# Patient Record
Sex: FEMALE | Race: BLACK OR AFRICAN AMERICAN | NOT HISPANIC OR LATINO
[De-identification: names, ages, dates, MRNs, and addresses within clinical notes are randomized per-mention and may not be internally consistent; named-entity substitution may affect disease eponyms.]

---

## 2018-06-11 ENCOUNTER — APPOINTMENT (OUTPATIENT)
Dept: ANTEPARTUM | Facility: CLINIC | Age: 38
End: 2018-06-11
Payer: COMMERCIAL

## 2018-06-11 ENCOUNTER — APPOINTMENT (OUTPATIENT)
Dept: MATERNAL FETAL MEDICINE | Facility: CLINIC | Age: 38
End: 2018-06-11
Payer: COMMERCIAL

## 2018-06-11 ENCOUNTER — ASOB RESULT (OUTPATIENT)
Age: 38
End: 2018-06-11

## 2018-06-11 VITALS
SYSTOLIC BLOOD PRESSURE: 92 MMHG | HEIGHT: 60 IN | HEART RATE: 88 BPM | WEIGHT: 184 LBS | DIASTOLIC BLOOD PRESSURE: 64 MMHG | BODY MASS INDEX: 36.12 KG/M2

## 2018-06-11 DIAGNOSIS — O45.93 PREMATURE SEPARATION OF PLACENTA, UNSPECIFIED, THIRD TRIMESTER: ICD-10-CM

## 2018-06-11 DIAGNOSIS — Z78.9 OTHER SPECIFIED HEALTH STATUS: ICD-10-CM

## 2018-06-11 DIAGNOSIS — Z98.891 HISTORY OF UTERINE SCAR FROM PREVIOUS SURGERY: ICD-10-CM

## 2018-06-11 DIAGNOSIS — Z92.89 PERSONAL HISTORY OF OTHER MEDICAL TREATMENT: ICD-10-CM

## 2018-06-11 DIAGNOSIS — Z87.891 PERSONAL HISTORY OF NICOTINE DEPENDENCE: ICD-10-CM

## 2018-06-11 DIAGNOSIS — O09.529 SUPERVISION OF ELDERLY MULTIGRAVIDA, UNSPECIFIED TRIMESTER: ICD-10-CM

## 2018-06-11 PROCEDURE — 76816 OB US FOLLOW-UP PER FETUS: CPT

## 2018-06-11 PROCEDURE — 99244 OFF/OP CNSLTJ NEW/EST MOD 40: CPT

## 2018-06-11 RX ORDER — PRENATAL VIT NO.126/IRON/FOLIC 28MG-0.8MG
28-0.8 TABLET ORAL
Refills: 0 | Status: ACTIVE | COMMUNITY

## 2018-07-02 ENCOUNTER — OUTPATIENT (OUTPATIENT)
Dept: OUTPATIENT SERVICES | Facility: HOSPITAL | Age: 38
LOS: 1 days | End: 2018-07-02
Payer: COMMERCIAL

## 2018-07-02 DIAGNOSIS — Z01.818 ENCOUNTER FOR OTHER PREPROCEDURAL EXAMINATION: ICD-10-CM

## 2018-07-02 LAB
ANION GAP SERPL CALC-SCNC: 15 MMOL/L — SIGNIFICANT CHANGE UP (ref 5–17)
APPEARANCE UR: CLEAR — SIGNIFICANT CHANGE UP
APTT BLD: 30.7 SEC — SIGNIFICANT CHANGE UP (ref 27.5–37.4)
BASOPHILS # BLD AUTO: 0 K/UL — SIGNIFICANT CHANGE UP (ref 0–0.2)
BASOPHILS NFR BLD AUTO: 0.2 % — SIGNIFICANT CHANGE UP (ref 0–2)
BILIRUB UR-MCNC: NEGATIVE — SIGNIFICANT CHANGE UP
BUN SERPL-MCNC: 9 MG/DL — SIGNIFICANT CHANGE UP (ref 8–20)
CALCIUM SERPL-MCNC: 9 MG/DL — SIGNIFICANT CHANGE UP (ref 8.6–10.2)
CHLORIDE SERPL-SCNC: 100 MMOL/L — SIGNIFICANT CHANGE UP (ref 98–107)
CO2 SERPL-SCNC: 21 MMOL/L — LOW (ref 22–29)
COLOR SPEC: YELLOW — SIGNIFICANT CHANGE UP
CREAT SERPL-MCNC: 0.51 MG/DL — SIGNIFICANT CHANGE UP (ref 0.5–1.3)
DIFF PNL FLD: NEGATIVE — SIGNIFICANT CHANGE UP
EOSINOPHIL # BLD AUTO: 0.1 K/UL — SIGNIFICANT CHANGE UP (ref 0–0.5)
EOSINOPHIL NFR BLD AUTO: 1 % — SIGNIFICANT CHANGE UP (ref 0–6)
GLUCOSE SERPL-MCNC: 66 MG/DL — LOW (ref 70–115)
GLUCOSE UR QL: NEGATIVE MG/DL — SIGNIFICANT CHANGE UP
HCT VFR BLD CALC: 33.4 % — LOW (ref 37–47)
HGB BLD-MCNC: 11 G/DL — LOW (ref 12–16)
INR BLD: 1.05 RATIO — SIGNIFICANT CHANGE UP (ref 0.88–1.16)
KETONES UR-MCNC: NEGATIVE — SIGNIFICANT CHANGE UP
LEUKOCYTE ESTERASE UR-ACNC: NEGATIVE — SIGNIFICANT CHANGE UP
LYMPHOCYTES # BLD AUTO: 1.5 K/UL — SIGNIFICANT CHANGE UP (ref 1–4.8)
LYMPHOCYTES # BLD AUTO: 24.3 % — SIGNIFICANT CHANGE UP (ref 20–55)
MCHC RBC-ENTMCNC: 30.3 PG — SIGNIFICANT CHANGE UP (ref 27–31)
MCHC RBC-ENTMCNC: 32.9 G/DL — SIGNIFICANT CHANGE UP (ref 32–36)
MCV RBC AUTO: 92 FL — SIGNIFICANT CHANGE UP (ref 81–99)
MONOCYTES # BLD AUTO: 0.7 K/UL — SIGNIFICANT CHANGE UP (ref 0–0.8)
MONOCYTES NFR BLD AUTO: 10.9 % — HIGH (ref 3–10)
NEUTROPHILS # BLD AUTO: 3.9 K/UL — SIGNIFICANT CHANGE UP (ref 1.8–8)
NEUTROPHILS NFR BLD AUTO: 63 % — SIGNIFICANT CHANGE UP (ref 37–73)
NITRITE UR-MCNC: NEGATIVE — SIGNIFICANT CHANGE UP
PH UR: 6 — SIGNIFICANT CHANGE UP (ref 5–8)
PLATELET # BLD AUTO: 189 K/UL — SIGNIFICANT CHANGE UP (ref 150–400)
POTASSIUM SERPL-MCNC: 4 MMOL/L — SIGNIFICANT CHANGE UP (ref 3.5–5.3)
POTASSIUM SERPL-SCNC: 4 MMOL/L — SIGNIFICANT CHANGE UP (ref 3.5–5.3)
PROT UR-MCNC: 15 MG/DL
PROTHROM AB SERPL-ACNC: 11.6 SEC — SIGNIFICANT CHANGE UP (ref 9.8–12.7)
RBC # BLD: 3.63 M/UL — LOW (ref 4.4–5.2)
RBC # FLD: 14.7 % — SIGNIFICANT CHANGE UP (ref 11–15.6)
SODIUM SERPL-SCNC: 136 MMOL/L — SIGNIFICANT CHANGE UP (ref 135–145)
SP GR SPEC: 1.02 — SIGNIFICANT CHANGE UP (ref 1.01–1.02)
TYPE + AB SCN PNL BLD: SIGNIFICANT CHANGE UP
UROBILINOGEN FLD QL: NEGATIVE MG/DL — SIGNIFICANT CHANGE UP
WBC # BLD: 6.2 K/UL — SIGNIFICANT CHANGE UP (ref 4.8–10.8)
WBC # FLD AUTO: 6.2 K/UL — SIGNIFICANT CHANGE UP (ref 4.8–10.8)

## 2018-07-02 PROCEDURE — 85610 PROTHROMBIN TIME: CPT

## 2018-07-02 PROCEDURE — 86901 BLOOD TYPING SEROLOGIC RH(D): CPT

## 2018-07-02 PROCEDURE — 85730 THROMBOPLASTIN TIME PARTIAL: CPT

## 2018-07-02 PROCEDURE — 86900 BLOOD TYPING SEROLOGIC ABO: CPT

## 2018-07-02 PROCEDURE — 36415 COLL VENOUS BLD VENIPUNCTURE: CPT

## 2018-07-02 PROCEDURE — 81001 URINALYSIS AUTO W/SCOPE: CPT

## 2018-07-02 PROCEDURE — 86850 RBC ANTIBODY SCREEN: CPT

## 2018-07-02 PROCEDURE — 85027 COMPLETE CBC AUTOMATED: CPT

## 2018-07-02 PROCEDURE — 80048 BASIC METABOLIC PNL TOTAL CA: CPT

## 2018-07-15 ENCOUNTER — TRANSCRIPTION ENCOUNTER (OUTPATIENT)
Age: 38
End: 2018-07-15

## 2018-07-16 ENCOUNTER — INPATIENT (INPATIENT)
Facility: HOSPITAL | Age: 38
LOS: 3 days | Discharge: HOME | End: 2018-07-20
Attending: OBSTETRICS & GYNECOLOGY | Admitting: OBSTETRICS & GYNECOLOGY
Payer: COMMERCIAL

## 2018-07-16 ENCOUNTER — TRANSCRIPTION ENCOUNTER (OUTPATIENT)
Age: 38
End: 2018-07-16

## 2018-07-16 VITALS — HEIGHT: 60 IN | WEIGHT: 187.39 LBS

## 2018-07-16 DIAGNOSIS — O34.219 MATERNAL CARE FOR UNSPECIFIED TYPE SCAR FROM PREVIOUS CESAREAN DELIVERY: ICD-10-CM

## 2018-07-16 LAB
BASE EXCESS BLDCOA CALC-SCNC: -3.5 MMOL/L — LOW (ref -2–2)
BASE EXCESS BLDCOV CALC-SCNC: -2.8 MMOL/L — LOW (ref -2–2)
BLD GP AB SCN SERPL QL: SIGNIFICANT CHANGE UP
GAS PNL BLDCOV: 7.33 — SIGNIFICANT CHANGE UP (ref 7.25–7.45)
HCO3 BLDCOA-SCNC: 20 MMOL/L — LOW (ref 21–29)
HCO3 BLDCOV-SCNC: 22 MMOL/L — SIGNIFICANT CHANGE UP (ref 21–29)
PCO2 BLDCOA: 52.6 MMHG — SIGNIFICANT CHANGE UP (ref 32–68)
PCO2 BLDCOV: 44 MMHG — SIGNIFICANT CHANGE UP (ref 29–53)
PH BLDCOA: 7.26 — SIGNIFICANT CHANGE UP (ref 7.18–7.38)
PO2 BLDCOA: 12.6 MMHG — SIGNIFICANT CHANGE UP (ref 5.7–30.5)
PO2 BLDCOA: 29.2 MMHG — SIGNIFICANT CHANGE UP (ref 17–41)
SAO2 % BLDCOA: SIGNIFICANT CHANGE UP
SAO2 % BLDCOV: SIGNIFICANT CHANGE UP
TYPE + AB SCN PNL BLD: SIGNIFICANT CHANGE UP

## 2018-07-16 RX ORDER — ACETAMINOPHEN 500 MG
650 TABLET ORAL EVERY 6 HOURS
Qty: 0 | Refills: 0 | Status: DISCONTINUED | OUTPATIENT
Start: 2018-07-16 | End: 2018-07-20

## 2018-07-16 RX ORDER — OXYTOCIN 10 UNIT/ML
333.33 VIAL (ML) INJECTION
Qty: 20 | Refills: 0 | Status: DISCONTINUED | OUTPATIENT
Start: 2018-07-16 | End: 2018-07-20

## 2018-07-16 RX ORDER — DOCUSATE SODIUM 100 MG
100 CAPSULE ORAL
Qty: 0 | Refills: 0 | Status: DISCONTINUED | OUTPATIENT
Start: 2018-07-16 | End: 2018-07-20

## 2018-07-16 RX ORDER — ONDANSETRON 8 MG/1
4 TABLET, FILM COATED ORAL EVERY 6 HOURS
Qty: 0 | Refills: 0 | Status: DISCONTINUED | OUTPATIENT
Start: 2018-07-16 | End: 2018-07-16

## 2018-07-16 RX ORDER — HYDROMORPHONE HYDROCHLORIDE 2 MG/ML
0.5 INJECTION INTRAMUSCULAR; INTRAVENOUS; SUBCUTANEOUS
Qty: 0 | Refills: 0 | Status: DISCONTINUED | OUTPATIENT
Start: 2018-07-16 | End: 2018-07-20

## 2018-07-16 RX ORDER — HEPARIN SODIUM 5000 [USP'U]/ML
5000 INJECTION INTRAVENOUS; SUBCUTANEOUS EVERY 12 HOURS
Qty: 0 | Refills: 0 | Status: COMPLETED | OUTPATIENT
Start: 2018-07-17 | End: 2018-07-18

## 2018-07-16 RX ORDER — OXYTOCIN 10 UNIT/ML
41.67 VIAL (ML) INJECTION
Qty: 20 | Refills: 0 | Status: DISCONTINUED | OUTPATIENT
Start: 2018-07-16 | End: 2018-07-16

## 2018-07-16 RX ORDER — DIPHENHYDRAMINE HCL 50 MG
25 CAPSULE ORAL EVERY 6 HOURS
Qty: 0 | Refills: 0 | Status: DISCONTINUED | OUTPATIENT
Start: 2018-07-16 | End: 2018-07-20

## 2018-07-16 RX ORDER — FERROUS SULFATE 325(65) MG
325 TABLET ORAL DAILY
Qty: 0 | Refills: 0 | Status: DISCONTINUED | OUTPATIENT
Start: 2018-07-16 | End: 2018-07-20

## 2018-07-16 RX ORDER — TETANUS TOXOID, REDUCED DIPHTHERIA TOXOID AND ACELLULAR PERTUSSIS VACCINE, ADSORBED 5; 2.5; 8; 8; 2.5 [IU]/.5ML; [IU]/.5ML; UG/.5ML; UG/.5ML; UG/.5ML
0.5 SUSPENSION INTRAMUSCULAR ONCE
Qty: 0 | Refills: 0 | Status: DISCONTINUED | OUTPATIENT
Start: 2018-07-16 | End: 2018-07-20

## 2018-07-16 RX ORDER — SODIUM CHLORIDE 9 MG/ML
1000 INJECTION, SOLUTION INTRAVENOUS
Qty: 0 | Refills: 0 | Status: DISCONTINUED | OUTPATIENT
Start: 2018-07-16 | End: 2018-07-16

## 2018-07-16 RX ORDER — SODIUM CHLORIDE 9 MG/ML
1000 INJECTION, SOLUTION INTRAVENOUS
Qty: 0 | Refills: 0 | Status: DISCONTINUED | OUTPATIENT
Start: 2018-07-16 | End: 2018-07-20

## 2018-07-16 RX ORDER — OXYTOCIN 10 UNIT/ML
41.67 VIAL (ML) INJECTION
Qty: 20 | Refills: 0 | Status: DISCONTINUED | OUTPATIENT
Start: 2018-07-16 | End: 2018-07-20

## 2018-07-16 RX ORDER — KETOROLAC TROMETHAMINE 30 MG/ML
30 SYRINGE (ML) INJECTION EVERY 6 HOURS
Qty: 0 | Refills: 0 | Status: DISCONTINUED | OUTPATIENT
Start: 2018-07-16 | End: 2018-07-17

## 2018-07-16 RX ORDER — IBUPROFEN 200 MG
600 TABLET ORAL EVERY 6 HOURS
Qty: 0 | Refills: 0 | Status: DISCONTINUED | OUTPATIENT
Start: 2018-07-16 | End: 2018-07-20

## 2018-07-16 RX ORDER — CITRIC ACID/SODIUM CITRATE 300-500 MG
30 SOLUTION, ORAL ORAL ONCE
Qty: 0 | Refills: 0 | Status: COMPLETED | OUTPATIENT
Start: 2018-07-16 | End: 2018-07-16

## 2018-07-16 RX ORDER — SIMETHICONE 80 MG/1
80 TABLET, CHEWABLE ORAL EVERY 4 HOURS
Qty: 0 | Refills: 0 | Status: DISCONTINUED | OUTPATIENT
Start: 2018-07-16 | End: 2018-07-20

## 2018-07-16 RX ORDER — LANOLIN
1 OINTMENT (GRAM) TOPICAL
Qty: 0 | Refills: 0 | Status: DISCONTINUED | OUTPATIENT
Start: 2018-07-16 | End: 2018-07-20

## 2018-07-16 RX ORDER — ONDANSETRON 8 MG/1
4 TABLET, FILM COATED ORAL EVERY 6 HOURS
Qty: 0 | Refills: 0 | Status: DISCONTINUED | OUTPATIENT
Start: 2018-07-16 | End: 2018-07-20

## 2018-07-16 RX ORDER — DIPHENHYDRAMINE HCL 50 MG
50 CAPSULE ORAL EVERY 4 HOURS
Qty: 0 | Refills: 0 | Status: DISCONTINUED | OUTPATIENT
Start: 2018-07-16 | End: 2018-07-20

## 2018-07-16 RX ORDER — GLYCERIN ADULT
1 SUPPOSITORY, RECTAL RECTAL AT BEDTIME
Qty: 0 | Refills: 0 | Status: DISCONTINUED | OUTPATIENT
Start: 2018-07-16 | End: 2018-07-20

## 2018-07-16 RX ORDER — KETOROLAC TROMETHAMINE 30 MG/ML
30 SYRINGE (ML) INJECTION ONCE
Qty: 0 | Refills: 0 | Status: DISCONTINUED | OUTPATIENT
Start: 2018-07-16 | End: 2018-07-16

## 2018-07-16 RX ORDER — METOCLOPRAMIDE HCL 10 MG
10 TABLET ORAL ONCE
Qty: 0 | Refills: 0 | Status: DISCONTINUED | OUTPATIENT
Start: 2018-07-16 | End: 2018-07-20

## 2018-07-16 RX ORDER — SODIUM CHLORIDE 9 MG/ML
1000 INJECTION, SOLUTION INTRAVENOUS ONCE
Qty: 0 | Refills: 0 | Status: COMPLETED | OUTPATIENT
Start: 2018-07-16 | End: 2018-07-16

## 2018-07-16 RX ORDER — CEFAZOLIN SODIUM 1 G
2000 VIAL (EA) INJECTION ONCE
Qty: 0 | Refills: 0 | Status: COMPLETED | OUTPATIENT
Start: 2018-07-16 | End: 2018-07-16

## 2018-07-16 RX ORDER — NALOXONE HYDROCHLORIDE 4 MG/.1ML
0.1 SPRAY NASAL
Qty: 0 | Refills: 0 | Status: DISCONTINUED | OUTPATIENT
Start: 2018-07-16 | End: 2018-07-20

## 2018-07-16 RX ORDER — ACETAMINOPHEN 500 MG
1000 TABLET ORAL ONCE
Qty: 0 | Refills: 0 | Status: COMPLETED | OUTPATIENT
Start: 2018-07-16 | End: 2018-07-17

## 2018-07-16 RX ADMIN — Medication 30 MILLIGRAM(S): at 20:40

## 2018-07-16 RX ADMIN — Medication 30 MILLIGRAM(S): at 20:26

## 2018-07-16 RX ADMIN — Medication 30 MILLILITER(S): at 13:52

## 2018-07-16 RX ADMIN — SODIUM CHLORIDE 125 MILLILITER(S): 9 INJECTION, SOLUTION INTRAVENOUS at 13:43

## 2018-07-16 RX ADMIN — Medication 30 MILLIGRAM(S): at 16:28

## 2018-07-16 RX ADMIN — Medication 1000 MILLIUNIT(S)/MIN: at 16:22

## 2018-07-16 RX ADMIN — Medication 30 MILLIGRAM(S): at 16:13

## 2018-07-16 RX ADMIN — Medication 125 MILLIUNIT(S)/MIN: at 16:23

## 2018-07-16 RX ADMIN — SODIUM CHLORIDE 2000 MILLILITER(S): 9 INJECTION, SOLUTION INTRAVENOUS at 13:13

## 2018-07-16 RX ADMIN — Medication 100 MILLIGRAM(S): at 14:10

## 2018-07-16 NOTE — PATIENT PROFILE OB - NS PRO RUBELLA RECEIVED Y/N
5 Mm Punch Excision Text: A 5 mm punch biopsy was used to make an initial incision over the lesion.  After this overlying column of skin was removed, blunt dissection was used to free the lesion from the surrounding tissues and the lesion was extirpated through the surgical opening made by the punch biopsy. no...

## 2018-07-17 LAB
EOSINOPHIL # BLD AUTO: 0 K/UL — SIGNIFICANT CHANGE UP (ref 0–0.5)
EOSINOPHIL NFR BLD AUTO: 0 % — SIGNIFICANT CHANGE UP (ref 0–6)
HCT VFR BLD CALC: 30.5 % — LOW (ref 37–47)
HGB BLD-MCNC: 9.9 G/DL — LOW (ref 12–16)
HIV 1 & 2 AB SERPL IA.RAPID: SIGNIFICANT CHANGE UP
LYMPHOCYTES # BLD AUTO: 0.8 K/UL — LOW (ref 1–4.8)
LYMPHOCYTES # BLD AUTO: 6.3 % — LOW (ref 20–55)
MCHC RBC-ENTMCNC: 29.5 PG — SIGNIFICANT CHANGE UP (ref 27–31)
MCHC RBC-ENTMCNC: 32.5 G/DL — SIGNIFICANT CHANGE UP (ref 32–36)
MCV RBC AUTO: 90.8 FL — SIGNIFICANT CHANGE UP (ref 81–99)
MONOCYTES # BLD AUTO: 1.3 K/UL — HIGH (ref 0–0.8)
MONOCYTES NFR BLD AUTO: 10.1 % — HIGH (ref 3–10)
NEUTROPHILS # BLD AUTO: 10.8 K/UL — HIGH (ref 1.8–8)
NEUTROPHILS NFR BLD AUTO: 83.3 % — HIGH (ref 37–73)
PLATELET # BLD AUTO: 192 K/UL — SIGNIFICANT CHANGE UP (ref 150–400)
RBC # BLD: 3.36 M/UL — LOW (ref 4.4–5.2)
RBC # FLD: 14 % — SIGNIFICANT CHANGE UP (ref 11–15.6)
WBC # BLD: 13 K/UL — HIGH (ref 4.8–10.8)
WBC # FLD AUTO: 13 K/UL — HIGH (ref 4.8–10.8)

## 2018-07-17 RX ADMIN — Medication 400 MILLIGRAM(S): at 03:55

## 2018-07-17 RX ADMIN — Medication 325 MILLIGRAM(S): at 11:55

## 2018-07-17 RX ADMIN — Medication 30 MILLIGRAM(S): at 01:56

## 2018-07-17 RX ADMIN — Medication 30 MILLIGRAM(S): at 14:26

## 2018-07-17 RX ADMIN — Medication 30 MILLIGRAM(S): at 08:25

## 2018-07-17 RX ADMIN — SIMETHICONE 80 MILLIGRAM(S): 80 TABLET, CHEWABLE ORAL at 22:13

## 2018-07-17 RX ADMIN — Medication 30 MILLIGRAM(S): at 02:10

## 2018-07-17 RX ADMIN — HEPARIN SODIUM 5000 UNIT(S): 5000 INJECTION INTRAVENOUS; SUBCUTANEOUS at 11:55

## 2018-07-17 RX ADMIN — Medication 100 MILLIGRAM(S): at 22:13

## 2018-07-17 RX ADMIN — HYDROMORPHONE HYDROCHLORIDE 0.5 MILLIGRAM(S): 2 INJECTION INTRAMUSCULAR; INTRAVENOUS; SUBCUTANEOUS at 16:35

## 2018-07-17 RX ADMIN — SIMETHICONE 80 MILLIGRAM(S): 80 TABLET, CHEWABLE ORAL at 11:55

## 2018-07-17 RX ADMIN — Medication 600 MILLIGRAM(S): at 22:14

## 2018-07-17 RX ADMIN — Medication 100 MILLIGRAM(S): at 11:55

## 2018-07-17 RX ADMIN — Medication 1000 MILLIGRAM(S): at 04:05

## 2018-07-17 RX ADMIN — HYDROMORPHONE HYDROCHLORIDE 0.5 MILLIGRAM(S): 2 INJECTION INTRAMUSCULAR; INTRAVENOUS; SUBCUTANEOUS at 16:51

## 2018-07-17 RX ADMIN — HEPARIN SODIUM 5000 UNIT(S): 5000 INJECTION INTRAVENOUS; SUBCUTANEOUS at 00:28

## 2018-07-17 RX ADMIN — Medication 600 MILLIGRAM(S): at 23:00

## 2018-07-17 RX ADMIN — Medication 30 MILLIGRAM(S): at 08:10

## 2018-07-17 RX ADMIN — Medication 1 TABLET(S): at 11:55

## 2018-07-17 RX ADMIN — Medication 30 MILLIGRAM(S): at 14:41

## 2018-07-17 NOTE — DISCHARGE NOTE OB - MEDICATION SUMMARY - MEDICATIONS TO TAKE
I will START or STAY ON the medications listed below when I get home from the hospital:    ibuprofen 600 mg oral tablet  -- 1 tab(s) by mouth every 6 hours, As Needed -Mild pain or headache - for moderate pain   -- Indication: For Moderate pain    docusate sodium 100 mg oral capsule  -- 1 cap(s) by mouth 2 times a day, As needed, Stool Softening  -- Indication: For stool softening

## 2018-07-17 NOTE — DISCHARGE NOTE OB - CARE PROVIDER_API CALL
Alba Packer), Obstetrics and Gynecology  [address] [place] [address]  Phone: [phone]  Fax: [phone] Alba Packer), Obstetrics and Gynecology  [address] [place] [address]  Phone: [phone]  Fax: [phone]

## 2018-07-17 NOTE — DISCHARGE NOTE OB - HOSPITAL COURSE
37 y/o  now POD#3 s/p scheduled repeat  section. Uncomplicated hospital course. At the time of discharge patient was tolerating regular diet PO, ambulating, voiding, and having bowel movements and flatus. Pain well controlled with pain medications PRN.

## 2018-07-17 NOTE — DISCHARGE NOTE OB - PLAN OF CARE
rapid recovery Patient can transition to regular activity level and continue regular diet. Patient should follow up with Dr. Packer's office to schedule post-op follow up and incision check visits. Patient should contact doctor earlier should she develop persistent/increased vaginal bleeding or fever.

## 2018-07-17 NOTE — DISCHARGE NOTE OB - CARE PLAN
Principal Discharge DX:	 delivery, delivered, current hospitalization  Goal:	rapid recovery  Assessment and plan of treatment:	Patient can transition to regular activity level and continue regular diet. Patient should follow up with Dr. Packer's office to schedule post-op follow up and incision check visits. Patient should contact doctor earlier should she develop persistent/increased vaginal bleeding or fever.

## 2018-07-17 NOTE — PROGRESS NOTE ADULT - PROBLEM SELECTOR PLAN 1
Continue routine post-partum care. Continue to encourage ambulation and breast feeding. Pain management PRN.

## 2018-07-17 NOTE — DISCHARGE NOTE OB - PATIENT PORTAL LINK FT
You can access the [facility] Patient Portal, offered by [facility], by registering with the following website: [email or web address]

## 2018-07-18 ENCOUNTER — RESULT REVIEW (OUTPATIENT)
Age: 38
End: 2018-07-18

## 2018-07-18 LAB
RUBV IGG SER-ACNC: 2.5 INDEX — SIGNIFICANT CHANGE UP
RUBV IGG SER-IMP: POSITIVE — SIGNIFICANT CHANGE UP
T PALLIDUM AB TITR SER: NEGATIVE — SIGNIFICANT CHANGE UP

## 2018-07-18 PROCEDURE — 88304 TISSUE EXAM BY PATHOLOGIST: CPT | Mod: 26

## 2018-07-18 RX ORDER — OXYCODONE AND ACETAMINOPHEN 5; 325 MG/1; MG/1
1 TABLET ORAL EVERY 6 HOURS
Qty: 0 | Refills: 0 | Status: DISCONTINUED | OUTPATIENT
Start: 2018-07-18 | End: 2018-07-20

## 2018-07-18 RX ORDER — DOCUSATE SODIUM 100 MG
1 CAPSULE ORAL
Qty: 60 | Refills: 0 | OUTPATIENT
Start: 2018-07-18 | End: 2018-08-16

## 2018-07-18 RX ORDER — IBUPROFEN 200 MG
1 TABLET ORAL
Qty: 120 | Refills: 0 | OUTPATIENT
Start: 2018-07-18 | End: 2018-08-16

## 2018-07-18 RX ADMIN — OXYCODONE AND ACETAMINOPHEN 1 TABLET(S): 5; 325 TABLET ORAL at 17:43

## 2018-07-18 RX ADMIN — Medication 600 MILLIGRAM(S): at 11:40

## 2018-07-18 RX ADMIN — Medication 325 MILLIGRAM(S): at 11:38

## 2018-07-18 RX ADMIN — HYDROMORPHONE HYDROCHLORIDE 0.5 MILLIGRAM(S): 2 INJECTION INTRAMUSCULAR; INTRAVENOUS; SUBCUTANEOUS at 11:20

## 2018-07-18 RX ADMIN — Medication 600 MILLIGRAM(S): at 06:11

## 2018-07-18 RX ADMIN — HEPARIN SODIUM 5000 UNIT(S): 5000 INJECTION INTRAVENOUS; SUBCUTANEOUS at 11:39

## 2018-07-18 RX ADMIN — OXYCODONE AND ACETAMINOPHEN 1 TABLET(S): 5; 325 TABLET ORAL at 18:33

## 2018-07-18 RX ADMIN — OXYCODONE AND ACETAMINOPHEN 1 TABLET(S): 5; 325 TABLET ORAL at 23:05

## 2018-07-18 RX ADMIN — HYDROMORPHONE HYDROCHLORIDE 0.5 MILLIGRAM(S): 2 INJECTION INTRAMUSCULAR; INTRAVENOUS; SUBCUTANEOUS at 01:40

## 2018-07-18 RX ADMIN — Medication 1 TABLET(S): at 11:39

## 2018-07-18 RX ADMIN — HEPARIN SODIUM 5000 UNIT(S): 5000 INJECTION INTRAVENOUS; SUBCUTANEOUS at 01:26

## 2018-07-18 RX ADMIN — Medication 600 MILLIGRAM(S): at 06:42

## 2018-07-18 RX ADMIN — HYDROMORPHONE HYDROCHLORIDE 0.5 MILLIGRAM(S): 2 INJECTION INTRAMUSCULAR; INTRAVENOUS; SUBCUTANEOUS at 11:05

## 2018-07-18 RX ADMIN — Medication 600 MILLIGRAM(S): at 12:30

## 2018-07-18 RX ADMIN — HYDROMORPHONE HYDROCHLORIDE 0.5 MILLIGRAM(S): 2 INJECTION INTRAMUSCULAR; INTRAVENOUS; SUBCUTANEOUS at 01:26

## 2018-07-18 NOTE — PROGRESS NOTE ADULT - PROBLEM SELECTOR PLAN 1
Continue routine post-partum care. Continue to encourage ambulation and breast feeding. Will optimize pain management and order stool softener.

## 2018-07-19 RX ADMIN — Medication 600 MILLIGRAM(S): at 22:22

## 2018-07-19 RX ADMIN — Medication 325 MILLIGRAM(S): at 13:15

## 2018-07-19 RX ADMIN — OXYCODONE AND ACETAMINOPHEN 1 TABLET(S): 5; 325 TABLET ORAL at 13:51

## 2018-07-19 RX ADMIN — OXYCODONE AND ACETAMINOPHEN 1 TABLET(S): 5; 325 TABLET ORAL at 20:31

## 2018-07-19 RX ADMIN — Medication 600 MILLIGRAM(S): at 21:52

## 2018-07-19 RX ADMIN — Medication 1 TABLET(S): at 13:15

## 2018-07-19 RX ADMIN — OXYCODONE AND ACETAMINOPHEN 1 TABLET(S): 5; 325 TABLET ORAL at 14:30

## 2018-07-19 RX ADMIN — OXYCODONE AND ACETAMINOPHEN 1 TABLET(S): 5; 325 TABLET ORAL at 21:01

## 2018-07-19 RX ADMIN — Medication 600 MILLIGRAM(S): at 05:47

## 2018-07-19 RX ADMIN — Medication 600 MILLIGRAM(S): at 07:22

## 2018-07-19 RX ADMIN — OXYCODONE AND ACETAMINOPHEN 1 TABLET(S): 5; 325 TABLET ORAL at 09:00

## 2018-07-19 RX ADMIN — Medication 600 MILLIGRAM(S): at 13:15

## 2018-07-19 RX ADMIN — Medication 600 MILLIGRAM(S): at 13:45

## 2018-07-19 RX ADMIN — OXYCODONE AND ACETAMINOPHEN 1 TABLET(S): 5; 325 TABLET ORAL at 08:01

## 2018-07-19 RX ADMIN — OXYCODONE AND ACETAMINOPHEN 1 TABLET(S): 5; 325 TABLET ORAL at 01:29

## 2018-07-20 VITALS
HEART RATE: 65 BPM | DIASTOLIC BLOOD PRESSURE: 63 MMHG | TEMPERATURE: 98 F | SYSTOLIC BLOOD PRESSURE: 102 MMHG | RESPIRATION RATE: 18 BRPM

## 2018-07-20 LAB — SURGICAL PATHOLOGY FINAL REPORT - CH: SIGNIFICANT CHANGE UP

## 2018-07-20 PROCEDURE — 86780 TREPONEMA PALLIDUM: CPT

## 2018-07-20 PROCEDURE — 82803 BLOOD GASES ANY COMBINATION: CPT

## 2018-07-20 PROCEDURE — 86900 BLOOD TYPING SEROLOGIC ABO: CPT

## 2018-07-20 PROCEDURE — 88304 TISSUE EXAM BY PATHOLOGIST: CPT

## 2018-07-20 PROCEDURE — 59025 FETAL NON-STRESS TEST: CPT

## 2018-07-20 PROCEDURE — 86901 BLOOD TYPING SEROLOGIC RH(D): CPT

## 2018-07-20 PROCEDURE — 59050 FETAL MONITOR W/REPORT: CPT

## 2018-07-20 PROCEDURE — 36415 COLL VENOUS BLD VENIPUNCTURE: CPT

## 2018-07-20 PROCEDURE — 86850 RBC ANTIBODY SCREEN: CPT

## 2018-07-20 PROCEDURE — C1765: CPT

## 2018-07-20 PROCEDURE — 86923 COMPATIBILITY TEST ELECTRIC: CPT

## 2018-07-20 PROCEDURE — 85027 COMPLETE CBC AUTOMATED: CPT

## 2018-07-20 PROCEDURE — 86762 RUBELLA ANTIBODY: CPT

## 2018-07-20 PROCEDURE — C1889: CPT

## 2018-07-20 PROCEDURE — 86703 HIV-1/HIV-2 1 RESULT ANTBDY: CPT

## 2018-07-20 RX ADMIN — Medication 600 MILLIGRAM(S): at 05:17

## 2018-07-20 RX ADMIN — Medication 600 MILLIGRAM(S): at 05:47

## 2018-07-20 RX ADMIN — Medication 600 MILLIGRAM(S): at 14:00

## 2018-07-20 RX ADMIN — Medication 325 MILLIGRAM(S): at 13:08

## 2018-07-20 RX ADMIN — OXYCODONE AND ACETAMINOPHEN 1 TABLET(S): 5; 325 TABLET ORAL at 10:50

## 2018-07-20 RX ADMIN — OXYCODONE AND ACETAMINOPHEN 1 TABLET(S): 5; 325 TABLET ORAL at 04:19

## 2018-07-20 RX ADMIN — Medication 1 TABLET(S): at 13:08

## 2018-07-20 RX ADMIN — OXYCODONE AND ACETAMINOPHEN 1 TABLET(S): 5; 325 TABLET ORAL at 09:55

## 2018-07-20 RX ADMIN — Medication 600 MILLIGRAM(S): at 13:08

## 2018-07-20 RX ADMIN — OXYCODONE AND ACETAMINOPHEN 1 TABLET(S): 5; 325 TABLET ORAL at 03:49

## 2018-07-20 NOTE — PROGRESS NOTE ADULT - SUBJECTIVE AND OBJECTIVE BOX
No complaints post regional (Spinal or Epidural) anesthesia for C/Section.    Denies headache or PONV.    No residual numbness or weakness noted.  Pain management satisfactory  VSS / OOB
38y year old  now POD#1 s/p repeat  at 38wks gestation, previous classical incision  x1.      No acute overnight events. Pain well controlled.  Patient is ambulating, +voiding, -Flatus, -BM, +Breast feeding and pumping.  Reports minimal lochia.     VS:   Vital Signs Last 24 Hrs  T(C): 36.7 (2018 05:02), Max: 37.1 (2018 15:45)  T(F): 98.1 (2018 05:02), Max: 98.8 (2018 15:45)  HR: 65 (2018 05:02) (60 - 87)  BP: 110/70 (2018 05:02) (75/49 - 128/49)  RR: 18 (2018 05:02) (14 - 20)  SpO2: 97% (2018 05:02) (97% - 100%)    Physical Exam:  General: NAD  Abdomen: soft, ND, firm fundus palpated 1cm below the umbilicus. Rosa dressing in place and dry.  Ext: nontender lower extremities.
38y year old  now POD#2 s/p repeat  at 38wks gestation.     No acute overnight events. In moderate pain, 6/10.  Patient is having difficulty ambulating due to pain. +voiding, +Flatus x1, -BM, +Breast feeding.  Reports minimal lochia.     VS:   Vital Signs Last 24 Hrs  T(C): 36.9 (2018 18:58), Max: 37.3 (2018 15:44)  T(F): 98.5 (2018 18:58), Max: 99.1 (2018 15:44)  HR: 86 (2018 18:58) (66 - 94)  BP: 110/60 (2018 18:58) (90/51 - 141/79)  RR: 18 (2018 18:58) (18 - 18)  SpO2: 98% (2018 18:58) (98% - 98%)    Physical Exam:  General: NAD  Abdomen: distended, rocio dressing in place.   Ext: nontender lower extremities.     Labs:                        9.9    13.0  )-----------( 192      ( 2018 06:26 )             30.5
38y year old  now POD#3 s/p repeat  section at 38wks gestation.     No acute overnight events. Pain well controlled and improved from yesterday.  Patient is ambulating, +voiding, +Flatus, -BM, +Breast feeding.  Reports minimal lochia.     VS:   Vital Signs Last 24 Hrs  T(C): 36.7 (2018 19:20), Max: 36.9 (2018 08:04)  T(F): 98.1 (2018 19:20), Max: 98.4 (2018 08:04)  HR: 84 (2018 19:20) (75 - 84)  BP: 107/56 (2018 19:20) (103/64 - 107/56)  RR: 18 (2018 19:20) (18 - 18)    Physical Exam:  General: NAD  Abdomen: soft, distention has improved since yesterday, firm fundus palpated at the umbilicus. Rosa dressing in place.  Ext: nontender lower extremities.     Labs:                        9.9    13.0  )-----------( 192      ( 2018 06:26 )             30.5
38y year old  now POD#4 s/p repeat  at 38w gestation.      No acute overnight events. Pain well controlled and improving.   Patient is ambulating, +voiding, +Flatus, -BM, +Breast feeding.  Reports minimal lochia.     VS:   Vital Signs Last 24 Hrs  T(C): 36.9 (2018 19:53), Max: 36.9 (2018 19:53)  T(F): 98.4 (2018 19:53), Max: 98.4 (2018 19:53)  HR: 75 (2018 19:53) (75 - 94)  BP: 111/64 (2018 19:53) (111/64 - 118/77)  RR: 18 (2018 19:53) (18 - 18)    Physical Exam:  General: NAD  Abdomen: soft, mildly disended, firm fundus palpated 1cm below the umbilicus. Rosa dressing removed, incision clean, dry, and intact.  Ext: nontender.
PO # 1  Pt without complaints  Vital Signs Last 24 Hrs  T(C): 36.7 (17 Jul 2018 05:02), Max: 37.1 (16 Jul 2018 15:45)  T(F): 98.1 (17 Jul 2018 05:02), Max: 98.8 (16 Jul 2018 15:45)  HR: 65 (17 Jul 2018 05:02) (60 - 87)  BP: 110/70 (17 Jul 2018 05:02) (75/49 - 128/49)  BP(mean): --  RR: 18 (17 Jul 2018 05:02) (14 - 20)  SpO2: 97% (17 Jul 2018 05:02) (97% - 100%)    abdomen soft, utx firm/NT. incision C/D/I   small blood  extremities -E/-E  Labs pending  A/P s/p R C/S, BS, doing well. OOB/spirometry. Advance diet. Check CBC.
PO # 3  Pt without complaints +flatus -BM  Vital Signs Last 24 Hrs  T(C): 36.7 (18 Jul 2018 19:20), Max: 36.9 (18 Jul 2018 08:04)  T(F): 98.1 (18 Jul 2018 19:20), Max: 98.4 (18 Jul 2018 08:04)  HR: 84 (18 Jul 2018 19:20) (75 - 84)  BP: 107/56 (18 Jul 2018 19:20) (103/64 - 107/56)  BP(mean): --  RR: 18 (18 Jul 2018 19:20) (18 - 18)  SpO2: --  abdomen soft, ND/NT. incision bandaged, C/D   small blood  A/P s/p R C/S, bilateral salpingectomy doing well. Discharge home tomorrow.
POD#2  Doping well  Incisional pain.  Not ambulating much yet. Encouraged  Voiding and tolerating reg diet.  Lochia light  VSS  Incision C/D/I. On Q  intact and infusing. Left in place  Fundus firm and nontender  P: Routine care  Pain management.
Pt doing well and breastfeeding baby girl without issues. Pt tolerating PO without nausea or vomiting and is ambulating and passing flatus. Pt denies chest pain, shortness of breath, fevers, chills, dizziness or lightheadedness and only complains of a dull headaches.    Vital Signs Last 24 Hrs  T(C): 36.9 (19 Jul 2018 19:53), Max: 36.9 (19 Jul 2018 19:53)  T(F): 98.4 (19 Jul 2018 19:53), Max: 98.4 (19 Jul 2018 19:53)  HR: 75 (19 Jul 2018 19:53) (75 - 75)  BP: 111/64 (19 Jul 2018 19:53) (111/64 - 111/64)  BP(mean): --  RR: 18 (19 Jul 2018 19:53) (18 - 18)  SpO2: --    Gen NAD  Abdomen soft, nondistended, no rebounding or guarding. Firm uterine fundus 2cm below umbilicus. Incision c/d/i  Ext c/c/e    PO H/H: >10/30  O positive

## 2018-07-20 NOTE — PROGRESS NOTE ADULT - ASSESSMENT
POD#4 s/p repeat CD and bilateral salpingectomy, stable for discharge to home.  1. Routine postop/postpartum care  2. RH positive  3. Incision care and postop instructions reviewed  4. D/c home with incision check in 1 week and PPV in 6 weks

## 2020-08-19 NOTE — PROGRESS NOTE ADULT - PROVIDER SPECIALTY LIST ADULT
----- Message from Keely Celaya sent at 8/19/2020 11:06 AM CDT -----  Contact: Pt. [phone]  Vimpat 200 Mg refill request     [facility] [address] [place] [address] [address] [facility] [address] [address]  Phone: [phone] Fax: [phone]          
OB
Anesthesia

## 2022-07-24 ENCOUNTER — EMERGENCY (EMERGENCY)
Facility: HOSPITAL | Age: 42
LOS: 1 days | Discharge: HOME | End: 2022-07-24
Attending: EMERGENCY MEDICINE
Payer: COMMERCIAL

## 2022-07-24 VITALS
HEIGHT: 60 IN | TEMPERATURE: 98 F | HEART RATE: 63 BPM | DIASTOLIC BLOOD PRESSURE: 78 MMHG | SYSTOLIC BLOOD PRESSURE: 122 MMHG | RESPIRATION RATE: 18 BRPM | WEIGHT: 169.98 LBS | OXYGEN SATURATION: 98 %

## 2022-07-24 PROCEDURE — 99283 EMERGENCY DEPT VISIT LOW MDM: CPT

## 2022-07-24 RX ORDER — IBUPROFEN 200 MG
600 TABLET ORAL ONCE
Refills: 0 | Status: COMPLETED | OUTPATIENT
Start: 2022-07-24 | End: 2022-07-24

## 2022-07-24 RX ORDER — IBUPROFEN 200 MG
1 TABLET ORAL
Qty: 28 | Refills: 0
Start: 2022-07-24 | End: 2022-07-30

## 2022-07-24 RX ADMIN — Medication 300 MILLIGRAM(S): at 22:48

## 2022-07-24 RX ADMIN — Medication 600 MILLIGRAM(S): at 22:48

## 2022-07-24 NOTE — ED PROVIDER NOTE - PATIENT PORTAL LINK FT
You can access the FollowMyHealth Patient Portal offered by [facility] by registering at the following website: [email or web address]. By joining [employer]’s FollowMyHealth portal, you will also be able to view your health information using other applications (apps) compatible with our system.

## 2022-07-24 NOTE — ED PROVIDER NOTE - CLINICAL SUMMARY MEDICAL DECISION MAKING FREE TEXT BOX
43 yo female no PMHx presents to ED c/o b/l jaw pain/swelling. Presented to UC for same, prescribed prednisone. Possible TMJ discussed with patient, no signs of infection. Follow up with dentist. Patient to be discharged. Patient and/or guardian provided verbal/written discharge instructions and return precautions. Patient and/or guardian expresses understanding and agreement. 43 yo female no PMHx presents to ED c/o b/l jaw pain/swelling. Presented to UC for same, prescribed prednisone. ?Parotitis. Abx, follow up with dentist. Patient to be discharged. Patient and/or guardian provided verbal/written discharge instructions and return precautions. Patient and/or guardian expresses understanding and agreement.

## 2022-07-24 NOTE — ED PROVIDER NOTE - NSFOLLOWUPINSTRUCTIONS_ED_ALL_ED_FT
- Ibuprofen 600mg every 6 hours as needed for pain.  - Acetaminophen 650mg every 6 hours as needed for pain.  - Please bring all documentation from your ED visit to any related future follow up appointment.  - Please call to schedule follow up appointment with your primary care physician within 24-48 hours.  - Please seek immediate medical attention or return to the ED for any new/worsening, signs/symptoms, or concerns. - Prescription sent to pharmacy.   - Ibuprofen 600mg every 6 hours as needed for pain.  - Acetaminophen 650mg every 6 hours as needed for pain.  - Lemon drops.   - Please bring all documentation from your ED visit to any related future follow up appointment.  - Please call to schedule follow up appointment with your primary care physician within 24-48 hours.  - Please seek immediate medical attention or return to the ED for any new/worsening, signs/symptoms, or concerns.      Parotitis       Parotitis is inflammation of one or both of your parotid glands. These glands produce saliva. They are found on each side of your face, below and in front of your earlobes. The saliva that they produce comes out of tiny openings (ducts) inside your cheeks.    Parotitis may cause sudden swelling and pain (acute parotitis). It can also cause repeated episodes of swelling and pain or continued swelling that may or may not be painful (chronic parotitis).      What are the causes?    This condition may be caused by:  •Infections from bacteria.      •Infections from viruses, such as mumps or HIV.      •Blockage (obstruction) of saliva flow through the parotid glands. This can be from a stone, scar tissue, or a tumor.      •Diseases that cause your body's defense system (immune system) to attack healthy cells in your salivary glands. These are called autoimmune diseases.        What increases the risk?    You are more likely to develop this condition if:  •You are 50 years old or older.      •You do not drink enough fluids (are dehydrated).      •You drink too much alcohol.    •You have:  •A dry mouth.      •Poor dental hygiene.      •Diabetes.      •Gout.      •A long-term illness.        •You have had radiation treatments to the head and neck.      •You take certain medicines.        What are the signs or symptoms?    Symptoms of this condition depend on the cause. Symptoms may include:  •Swelling under and in front of the ear. This may get worse after eating.      •Redness of the skin over the parotid gland.      •Pain and tenderness over the parotid gland. This may get worse after eating.      •Fever or chills.      •Pus coming from the ducts inside the mouth.      •Dry mouth.      •A bad taste in the mouth.        How is this diagnosed?    This condition may be diagnosed based on:  •Your medical history.      •A physical exam.    •Tests to find the cause of the parotitis. These may include:  •Doing blood tests to check for an autoimmune disease or infections from a virus.      •Taking a fluid sample from the parotid gland and testing it for infection.      •Injecting the ducts of the parotid gland with a dye and then taking X-rays (sialogram).      •Having other imaging tests of the gland, such as X-rays, ultrasound, MRI, or CT scan.      •Checking the opening of the gland for a stone or obstruction.      •Placing a needle into the gland to remove tissue for a biopsy (fine needle aspiration).          How is this treated?    Treatment for this condition depends on the cause. Treatment may include:  •Antibiotic medicine for a bacterial infection.      •Drinking more fluids.      •Removing a stone or obstruction.      •Treating an underlying disease that is causing parotitis.      •Surgery to drain an infection, remove a growth, or remove the whole gland (parotidectomy).      Treatment may not be needed if parotid swelling goes away with home care.      Follow these instructions at home:      Medicines      •Take over-the-counter and prescription medicines only as told by your health care provider.      •If you were prescribed an antibiotic medicine, take it as told by your health care provider. Do not stop taking the antibiotic even if you start to feel better.      Managing pain and swelling   •If directed, apply heat to the affected area as often as told by your health care provider. Use the heat source that your health care provider recommends, such as a moist heat pack or a heating pad. To apply the heat:  •Place a towel between your skin and the heat source.      •Leave the heat on for 20–30 minutes.      •Remove the heat if your skin turns bright red. This is especially important if you are unable to feel pain, heat, or cold. You may have a greater risk of getting burned.        •Gargle with a salt-water mixture 3–4 times a day or as needed. To make a salt-water mixture, completely dissolve ½–1 tsp (3–6 g) of salt in 1 cup (237 mL) of warm water.      •Gently massage the parotid glands as told by your health care provider.        General instructions      •Drink enough fluid to keep your urine pale yellow.      •Keep your mouth clean and moist.      •Try sucking on sour candy. This may help to make your mouth less dry by stimulating the flow of saliva.    •Maintain good oral health.  •Brush your teeth at least two times a day.      •Floss your teeth every day.      •See your dentist regularly.        • Do not use any products that contain nicotine or tobacco, such as cigarettes, e-cigarettes, and chewing tobacco. If you need help quitting, ask your health care provider.      • Do not drink alcohol.      •Keep all follow-up visits as told by your health care provider. This is important.        Contact a health care provider if:    •You have a fever or chills.      •You have new symptoms.      •Your symptoms get worse.      •Your symptoms do not improve with treatment.        Get help right away if:    •You have difficulty breathing or swallowing because of the swollen gland.        Summary    •Parotitis is inflammation of one or both of your parotid glands.      •Symptoms include pain and swelling under and in front of the ear. They may also include a fever and a bad taste in your mouth.      •This condition may be treated with antibiotics, increasing fluids, or surgery.      •In some cases, parotitis may go away on its own without treatment.      •You should drink plenty of fluids, maintain good oral hygiene, and avoid tobacco products.      This information is not intended to replace advice given to you by your health care provider. Make sure you discuss any questions you have with your health care provider.

## 2022-07-24 NOTE — ED PROVIDER NOTE - ATTENDING APP SHARED VISIT CONTRIBUTION OF CARE
pt with submandibular swelling and pain  pe as doc  tms - infcn  likely parotitis  agree w care plan/tx

## 2022-07-24 NOTE — ED PROVIDER NOTE - PHYSICAL EXAMINATION
Gen: Nontoxic, well appearing, in NAD.  Skin: Warm and dry as visualized.  Head: NC/AT.  Eyes: PERRLA. EOMI.  Mouth/Throat: Airway patent. Tolerating secretions, no drooling or trismus. Moist mucus membranes. Tonsils and pharynx without erythema or exudates. Tonsils not enlarged. Uvula midline, rises symmetrically. No abscess. No Moshe Angina. No swelling to jaw appreciated.   Neck: Supple, FROM. Trachea midline.   Resp: No distress.  Cardio: Well perfused.  Ext: No deformities. MAEx4. FROM.   Neuro: A&Ox3. Appears nonfocal.   Psych: Normal affect and mood.

## 2022-07-24 NOTE — ED PROVIDER NOTE - NSICDXPASTSURGICALHX_GEN_ALL_CORE_FT
PAST SURGICAL HISTORY:  History of  section (2012 x 2)    Missed  3/2014    S/P arthroscopic knee surgery Right knee ()

## 2022-07-24 NOTE — ED PROVIDER NOTE - OBJECTIVE STATEMENT
41 yo female no PMHx presents to ED c/o b/l jaw pain/swelling. Woke up with swelling Friday, presented to UC at the time, prescribed Prednisone and Zyrtec. s/p root canal 3 weeks ago. No further complaints at this time.  Denies fevers.

## 2022-07-24 NOTE — ED ADULT TRIAGE NOTE - CHIEF COMPLAINT QUOTE
pt c/o bilateral jaws pain and swelling, + discomfort ear clogging been taking prednisone and steroids , denies past medical hx,

## 2022-07-24 NOTE — ED PROVIDER NOTE - NS ED ATTENDING STATEMENT MOD
This was a shared visit with the JOSE E. I reviewed and verified the documentation and independently performed the documented:

## 2023-05-17 ENCOUNTER — EMERGENCY (EMERGENCY)
Facility: HOSPITAL | Age: 43
LOS: 1 days | Discharge: OTHER INSTITUTION | End: 2023-05-17
Attending: STUDENT IN AN ORGANIZED HEALTH CARE EDUCATION/TRAINING PROGRAM
Payer: COMMERCIAL

## 2023-05-17 VITALS
SYSTOLIC BLOOD PRESSURE: 148 MMHG | RESPIRATION RATE: 16 BRPM | HEART RATE: 82 BPM | DIASTOLIC BLOOD PRESSURE: 148 MMHG | TEMPERATURE: 98 F | OXYGEN SATURATION: 100 %

## 2023-05-17 LAB
ALBUMIN SERPL ELPH-MCNC: 4.5 G/DL — SIGNIFICANT CHANGE UP (ref 3.3–5.2)
ALP SERPL-CCNC: 62 U/L — SIGNIFICANT CHANGE UP (ref 40–120)
ALT FLD-CCNC: 14 U/L — SIGNIFICANT CHANGE UP
ANION GAP SERPL CALC-SCNC: 13 MMOL/L — SIGNIFICANT CHANGE UP (ref 5–17)
AST SERPL-CCNC: 21 U/L — SIGNIFICANT CHANGE UP
BASE EXCESS BLDV CALC-SCNC: 0.8 MMOL/L — SIGNIFICANT CHANGE UP (ref -2–3)
BASOPHILS # BLD AUTO: 0.03 K/UL — SIGNIFICANT CHANGE UP (ref 0–0.2)
BASOPHILS NFR BLD AUTO: 0.5 % — SIGNIFICANT CHANGE UP (ref 0–2)
BILIRUB SERPL-MCNC: 0.4 MG/DL — SIGNIFICANT CHANGE UP (ref 0.4–2)
BUN SERPL-MCNC: 11 MG/DL — SIGNIFICANT CHANGE UP (ref 8–20)
CA-I SERPL-SCNC: 1.18 MMOL/L — SIGNIFICANT CHANGE UP (ref 1.15–1.33)
CALCIUM SERPL-MCNC: 9.3 MG/DL — SIGNIFICANT CHANGE UP (ref 8.4–10.5)
CHLORIDE BLDV-SCNC: 105 MMOL/L — SIGNIFICANT CHANGE UP (ref 96–108)
CHLORIDE SERPL-SCNC: 101 MMOL/L — SIGNIFICANT CHANGE UP (ref 96–108)
CO2 SERPL-SCNC: 24 MMOL/L — SIGNIFICANT CHANGE UP (ref 22–29)
CREAT SERPL-MCNC: 0.82 MG/DL — SIGNIFICANT CHANGE UP (ref 0.5–1.3)
EGFR: 90 ML/MIN/1.73M2 — SIGNIFICANT CHANGE UP
EOSINOPHIL # BLD AUTO: 0.05 K/UL — SIGNIFICANT CHANGE UP (ref 0–0.5)
EOSINOPHIL NFR BLD AUTO: 0.9 % — SIGNIFICANT CHANGE UP (ref 0–6)
GAS PNL BLDV: 137 MMOL/L — SIGNIFICANT CHANGE UP (ref 136–145)
GAS PNL BLDV: SIGNIFICANT CHANGE UP
GLUCOSE BLDV-MCNC: 91 MG/DL — SIGNIFICANT CHANGE UP (ref 70–99)
GLUCOSE SERPL-MCNC: 92 MG/DL — SIGNIFICANT CHANGE UP (ref 70–99)
HCO3 BLDV-SCNC: 25 MMOL/L — SIGNIFICANT CHANGE UP (ref 22–29)
HCT VFR BLD CALC: 37.4 % — SIGNIFICANT CHANGE UP (ref 34.5–45)
HCT VFR BLDA CALC: 39 % — SIGNIFICANT CHANGE UP
HGB BLD CALC-MCNC: 13 G/DL — SIGNIFICANT CHANGE UP (ref 11.7–16.1)
HGB BLD-MCNC: 12.5 G/DL — SIGNIFICANT CHANGE UP (ref 11.5–15.5)
IMM GRANULOCYTES NFR BLD AUTO: 0.2 % — SIGNIFICANT CHANGE UP (ref 0–0.9)
LACTATE BLDV-MCNC: 1.4 MMOL/L — SIGNIFICANT CHANGE UP (ref 0.5–2)
LYMPHOCYTES # BLD AUTO: 2.11 K/UL — SIGNIFICANT CHANGE UP (ref 1–3.3)
LYMPHOCYTES # BLD AUTO: 36.1 % — SIGNIFICANT CHANGE UP (ref 13–44)
MCHC RBC-ENTMCNC: 31.3 PG — SIGNIFICANT CHANGE UP (ref 27–34)
MCHC RBC-ENTMCNC: 33.4 GM/DL — SIGNIFICANT CHANGE UP (ref 32–36)
MCV RBC AUTO: 93.7 FL — SIGNIFICANT CHANGE UP (ref 80–100)
MONOCYTES # BLD AUTO: 0.52 K/UL — SIGNIFICANT CHANGE UP (ref 0–0.9)
MONOCYTES NFR BLD AUTO: 8.9 % — SIGNIFICANT CHANGE UP (ref 2–14)
NEUTROPHILS # BLD AUTO: 3.13 K/UL — SIGNIFICANT CHANGE UP (ref 1.8–7.4)
NEUTROPHILS NFR BLD AUTO: 53.4 % — SIGNIFICANT CHANGE UP (ref 43–77)
PCO2 BLDV: 40 MMHG — SIGNIFICANT CHANGE UP (ref 39–42)
PH BLDV: 7.41 — SIGNIFICANT CHANGE UP (ref 7.32–7.43)
PLATELET # BLD AUTO: 231 K/UL — SIGNIFICANT CHANGE UP (ref 150–400)
PO2 BLDV: 177 MMHG — HIGH (ref 25–45)
POTASSIUM BLDV-SCNC: 3.7 MMOL/L — SIGNIFICANT CHANGE UP (ref 3.5–5.1)
POTASSIUM SERPL-MCNC: 3.7 MMOL/L — SIGNIFICANT CHANGE UP (ref 3.5–5.3)
POTASSIUM SERPL-SCNC: 3.7 MMOL/L — SIGNIFICANT CHANGE UP (ref 3.5–5.3)
PROT SERPL-MCNC: 7.7 G/DL — SIGNIFICANT CHANGE UP (ref 6.6–8.7)
RBC # BLD: 3.99 M/UL — SIGNIFICANT CHANGE UP (ref 3.8–5.2)
RBC # FLD: 13.2 % — SIGNIFICANT CHANGE UP (ref 10.3–14.5)
SAO2 % BLDV: 100 % — SIGNIFICANT CHANGE UP
SODIUM SERPL-SCNC: 138 MMOL/L — SIGNIFICANT CHANGE UP (ref 135–145)
WBC # BLD: 5.85 K/UL — SIGNIFICANT CHANGE UP (ref 3.8–10.5)
WBC # FLD AUTO: 5.85 K/UL — SIGNIFICANT CHANGE UP (ref 3.8–10.5)

## 2023-05-17 PROCEDURE — 99285 EMERGENCY DEPT VISIT HI MDM: CPT

## 2023-05-17 PROCEDURE — 71045 X-RAY EXAM CHEST 1 VIEW: CPT | Mod: 26

## 2023-05-17 PROCEDURE — 93010 ELECTROCARDIOGRAM REPORT: CPT

## 2023-05-17 RX ORDER — SODIUM CHLORIDE 9 MG/ML
1000 INJECTION, SOLUTION INTRAVENOUS ONCE
Refills: 0 | Status: COMPLETED | OUTPATIENT
Start: 2023-05-17 | End: 2023-05-17

## 2023-05-17 RX ORDER — DIPHENHYDRAMINE HCL 50 MG
25 CAPSULE ORAL ONCE
Refills: 0 | Status: COMPLETED | OUTPATIENT
Start: 2023-05-17 | End: 2023-05-17

## 2023-05-17 RX ADMIN — Medication 25 MILLIGRAM(S): at 22:47

## 2023-05-17 RX ADMIN — SODIUM CHLORIDE 1000 MILLILITER(S): 9 INJECTION, SOLUTION INTRAVENOUS at 22:47

## 2023-05-17 NOTE — ED PROVIDER NOTE - OBJECTIVE STATEMENT
43y Female 43y Female with unknown medical history BIBEMS presenting with unresponsiveness, found in vehicle. Given 2mg IN Narcan and 2mg IV Narcan prior to ED arrival with responsiveness. EMS reports finding suicide note in vehicle. Patient awake, spontaneously breathing but not providing history. Patient states she ate peanuts and is allergic to peanuts. No signs of respiratory distress. Abrasion noted to left wrist due.

## 2023-05-17 NOTE — ED PROVIDER NOTE - CLINICAL SUMMARY MEDICAL DECISION MAKING FREE TEXT BOX
43y Female with AMS found in vehicle in the setting of suicide note and improved unresponsiveness with Narcan administration. Hemodynamically stable, saturating well on RA, no wheezing or respiratory distress. Differential diagnosis includes but not limited to suicide attempt, drug overdose, intoxication, electrolyte derangement.

## 2023-05-17 NOTE — ED PROVIDER NOTE - PHYSICAL EXAMINATION
General: nontoxic appearing in no acute distress  Head: Normocephalic, atraumatic  Eyes: PERRLA, no conjunctival injection, no scleral icterus, EOMI  ENMT: Atraumatic external nose and ears  Neck: Soft and supple, trachea midline  Cardiac: Regular rate and regular rhythm, no murmurs  Resp: Unlabored respiratory effort, lungs CTAB  Abd: Soft, non-tender, non-distended  MSK: Spine midline and non-tender  Skin: Warm and dry, abrasion to left wrist.   Neuro: AO x 3, moves all extremities symmetrically, Motor strength and sensation grossly intact

## 2023-05-17 NOTE — ED PROVIDER NOTE - ATTENDING CONTRIBUTION TO CARE
Zak: I performed a face to face bedside interview with patient regarding history of present illness, review of symptoms and past medical history. I completed an independent physical exam.  I have discussed patient's plan of care with resident.   I agree with note as stated above including HISTORY OF PRESENT ILLNESS, HIV, PAST MEDICAL/SURGICAL/FAMILY/SOCIAL HISTORY, ALLERGIES AND HOME MEDICATIONS, REVIEW OF SYSTEMS, PHYSICAL EXAM, MEDICAL DECISION MAKING and any PROGRESS NOTES during the time I functioned as the attending physician for this patient unless otherwise noted. My brief assessment is as follows: 43F presents s/p suicide attempt. Small non bleeding laceration to L wrist. Ate peanuts in an attempt to kill herself. Originally unsure if she took meds as well to overdose, on reassessment denies taking medications. Does admit to suicidality. Labs/ecg unremarkable. Patient medically cleared for behavioral health evaluation.

## 2023-05-17 NOTE — ED PROVIDER NOTE - PROGRESS NOTE DETAILS
patient awake on reassessment. states she attempted to kill herself by ingesting peanuts and cutting her wrist because she is being evicted from her home. denies prior history of SI. denies HI/AVH. -Garrett, DO

## 2023-05-17 NOTE — ED ADULT TRIAGE NOTE - CHIEF COMPLAINT QUOTE
Pt. BIBA for suicidal attempt. Pt. yelled at bystander to call 911. Pt. was found unresponsive in car, given 2 of IN Narcan and 2 of IV Narcan given by EMS. Pt. is responsive to painful stimuli in ED. Pt. left suicide note for family in car. self harm abrasion to left wrist. Pt. not forth coming with what medications she took. MD Rawls at bedside for eval. Pt. changed into yellow gown and belongings secured.

## 2023-05-17 NOTE — ED ADULT TRIAGE NOTE - INTERNATIONAL TRAVEL
CLINICAL PHARMACY NOTE: MEDS TO BEDS    Total # of Prescriptions Filled: 2   The following medications were delivered to the patient:  MELATONIN 3 MG   VITAMIN B-12 1,000 MCG    Additional Documentation:   DELIVERED TO PT @ 4:35PM.  [id number] [facility] No

## 2023-05-18 VITALS
SYSTOLIC BLOOD PRESSURE: 131 MMHG | TEMPERATURE: 98 F | RESPIRATION RATE: 18 BRPM | DIASTOLIC BLOOD PRESSURE: 78 MMHG | HEART RATE: 71 BPM | OXYGEN SATURATION: 95 %

## 2023-05-18 DIAGNOSIS — T14.91XA SUICIDE ATTEMPT, INITIAL ENCOUNTER: ICD-10-CM

## 2023-05-18 DIAGNOSIS — S61.519A LACERATION WITHOUT FOREIGN BODY OF UNSPECIFIED WRIST, INITIAL ENCOUNTER: ICD-10-CM

## 2023-05-18 LAB
AMPHET UR-MCNC: NEGATIVE — SIGNIFICANT CHANGE UP
APAP SERPL-MCNC: <3 UG/ML — LOW (ref 10–26)
APPEARANCE UR: ABNORMAL
BACTERIA # UR AUTO: ABNORMAL
BARBITURATES UR SCN-MCNC: NEGATIVE — SIGNIFICANT CHANGE UP
BENZODIAZ UR-MCNC: NEGATIVE — SIGNIFICANT CHANGE UP
BILIRUB UR-MCNC: NEGATIVE — SIGNIFICANT CHANGE UP
COCAINE METAB.OTHER UR-MCNC: NEGATIVE — SIGNIFICANT CHANGE UP
COLOR SPEC: YELLOW — SIGNIFICANT CHANGE UP
DIFF PNL FLD: ABNORMAL
EPI CELLS # UR: SIGNIFICANT CHANGE UP
ETHANOL SERPL-MCNC: <10 MG/DL — SIGNIFICANT CHANGE UP (ref 0–9)
GLUCOSE UR QL: NEGATIVE MG/DL — SIGNIFICANT CHANGE UP
HCG SERPL-ACNC: <4 MIU/ML — SIGNIFICANT CHANGE UP
KETONES UR-MCNC: NEGATIVE — SIGNIFICANT CHANGE UP
LEUKOCYTE ESTERASE UR-ACNC: ABNORMAL
METHADONE UR-MCNC: NEGATIVE — SIGNIFICANT CHANGE UP
NITRITE UR-MCNC: NEGATIVE — SIGNIFICANT CHANGE UP
OPIATES UR-MCNC: NEGATIVE — SIGNIFICANT CHANGE UP
PCP SPEC-MCNC: SIGNIFICANT CHANGE UP
PCP UR-MCNC: NEGATIVE — SIGNIFICANT CHANGE UP
PH UR: 6 — SIGNIFICANT CHANGE UP (ref 5–8)
PROT UR-MCNC: 15
RBC CASTS # UR COMP ASSIST: SIGNIFICANT CHANGE UP /HPF (ref 0–4)
SALICYLATES SERPL-MCNC: <0.6 MG/DL — LOW (ref 10–20)
SARS-COV-2 RNA SPEC QL NAA+PROBE: SIGNIFICANT CHANGE UP
SP GR SPEC: 1.01 — SIGNIFICANT CHANGE UP (ref 1.01–1.02)
THC UR QL: NEGATIVE — SIGNIFICANT CHANGE UP
UROBILINOGEN FLD QL: NEGATIVE MG/DL — SIGNIFICANT CHANGE UP
WBC UR QL: SIGNIFICANT CHANGE UP /HPF (ref 0–5)

## 2023-05-18 PROCEDURE — 84295 ASSAY OF SERUM SODIUM: CPT

## 2023-05-18 PROCEDURE — 36415 COLL VENOUS BLD VENIPUNCTURE: CPT

## 2023-05-18 PROCEDURE — 81001 URINALYSIS AUTO W/SCOPE: CPT

## 2023-05-18 PROCEDURE — 99236 HOSP IP/OBS SAME DATE HI 85: CPT

## 2023-05-18 PROCEDURE — 80307 DRUG TEST PRSMV CHEM ANLYZR: CPT

## 2023-05-18 PROCEDURE — 96374 THER/PROPH/DIAG INJ IV PUSH: CPT

## 2023-05-18 PROCEDURE — 99285 EMERGENCY DEPT VISIT HI MDM: CPT

## 2023-05-18 PROCEDURE — 90715 TDAP VACCINE 7 YRS/> IM: CPT

## 2023-05-18 PROCEDURE — 80053 COMPREHEN METABOLIC PANEL: CPT

## 2023-05-18 PROCEDURE — 84702 CHORIONIC GONADOTROPIN TEST: CPT

## 2023-05-18 PROCEDURE — 85025 COMPLETE CBC W/AUTO DIFF WBC: CPT

## 2023-05-18 PROCEDURE — 82435 ASSAY OF BLOOD CHLORIDE: CPT

## 2023-05-18 PROCEDURE — 84132 ASSAY OF SERUM POTASSIUM: CPT

## 2023-05-18 PROCEDURE — 71045 X-RAY EXAM CHEST 1 VIEW: CPT

## 2023-05-18 PROCEDURE — 83605 ASSAY OF LACTIC ACID: CPT

## 2023-05-18 PROCEDURE — 90471 IMMUNIZATION ADMIN: CPT

## 2023-05-18 PROCEDURE — 85014 HEMATOCRIT: CPT

## 2023-05-18 PROCEDURE — 82803 BLOOD GASES ANY COMBINATION: CPT

## 2023-05-18 PROCEDURE — 82947 ASSAY GLUCOSE BLOOD QUANT: CPT

## 2023-05-18 PROCEDURE — 85018 HEMOGLOBIN: CPT

## 2023-05-18 PROCEDURE — 87635 SARS-COV-2 COVID-19 AMP PRB: CPT

## 2023-05-18 PROCEDURE — 82330 ASSAY OF CALCIUM: CPT

## 2023-05-18 PROCEDURE — G0378: CPT

## 2023-05-18 PROCEDURE — 93005 ELECTROCARDIOGRAM TRACING: CPT

## 2023-05-18 PROCEDURE — 99285 EMERGENCY DEPT VISIT HI MDM: CPT | Mod: 25

## 2023-05-18 RX ORDER — IBUPROFEN 200 MG
400 TABLET ORAL EVERY 8 HOURS
Refills: 0 | Status: DISCONTINUED | OUTPATIENT
Start: 2023-05-18 | End: 2023-05-25

## 2023-05-18 RX ORDER — TETANUS TOXOID, REDUCED DIPHTHERIA TOXOID AND ACELLULAR PERTUSSIS VACCINE, ADSORBED 5; 2.5; 8; 8; 2.5 [IU]/.5ML; [IU]/.5ML; UG/.5ML; UG/.5ML; UG/.5ML
0.5 SUSPENSION INTRAMUSCULAR ONCE
Refills: 0 | Status: COMPLETED | OUTPATIENT
Start: 2023-05-18 | End: 2023-05-18

## 2023-05-18 RX ORDER — AMOXICILLIN 250 MG/5ML
500 SUSPENSION, RECONSTITUTED, ORAL (ML) ORAL EVERY 8 HOURS
Refills: 0 | Status: DISCONTINUED | OUTPATIENT
Start: 2023-05-18 | End: 2023-05-25

## 2023-05-18 RX ADMIN — Medication 500 MILLIGRAM(S): at 14:25

## 2023-05-18 RX ADMIN — TETANUS TOXOID, REDUCED DIPHTHERIA TOXOID AND ACELLULAR PERTUSSIS VACCINE, ADSORBED 0.5 MILLILITER(S): 5; 2.5; 8; 8; 2.5 SUSPENSION INTRAMUSCULAR at 18:37

## 2023-05-18 NOTE — ED CDU PROVIDER INITIAL DAY NOTE - OBJECTIVE STATEMENT
43y Female with unknown medical history BIBEMS presenting with unresponsiveness, found in vehicle. Given 2mg IN Narcan and 2mg IV Narcan prior to ED arrival with responsiveness. EMS reports finding suicide note in vehicle. Patient awake, spontaneously breathing but not providing history. Patient states she ate peanuts and is allergic to peanuts. No signs of respiratory distress. Abrasion noted to left wrist.

## 2023-05-18 NOTE — ED BEHAVIORAL HEALTH ASSESSMENT NOTE - DESCRIPTION (FIRST USE, LAST USE, QUANTITY, FREQUENCY, DURATION)
Increased cigarette use to 1 pack per week over the past month. Prior smoked 1 black and mild each day

## 2023-05-18 NOTE — ED ADULT NURSE NOTE - OBJECTIVE STATEMENT
Pt presenting to Emergency Department BIBA due to suicidal attempt. EMS states [place]. Reports finding suicidal note. Pt reports Suicidal ideation. Denies HI. Pt placed in yellow gown. Belongings collected. Respirations even and unlabored. Skin warm to touch.

## 2023-05-18 NOTE — ED BEHAVIORAL HEALTH ASSESSMENT NOTE - DESCRIPTION
Patient was calm and cooperative in the ED and did not exhibit any aggression. Pt did not require any prn medications or any physical restraints. 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

## 2023-05-18 NOTE — ED ADULT NURSE REASSESSMENT NOTE - NS ED NURSE REASSESS COMMENT FT1
Patient noted sleeping during night. Patient requesting to see MD for her hear, stating her ears are close or clogged. She denied pain. ears orifice look cleaned, no drainage, no redness. MD made aware.

## 2023-05-18 NOTE — ED ADULT NURSE NOTE - NSFALLUNIVINTERV_ED_ALL_ED
Bed/Stretcher in lowest position, wheels locked, appropriate side rails in place/Call bell, personal items and telephone in reach/Instruct patient to call for assistance before getting out of bed/chair/stretcher/Non-slip footwear applied when patient is off stretcher/[place] to call system/Physically safe environment - no spills, clutter or unnecessary equipment/Purposeful proactive rounding/Room/bathroom lighting operational, light cord in reach

## 2023-05-18 NOTE — ED CDU PROVIDER INITIAL DAY NOTE - PSYCHIATRIC [+], MLM
DEPRESSION/SUICIDAL Otc Regimen: I recommended a broad spectrum sunscreen with a SPF of 30 or higher.  I explained that SPF 30 sunscreens block approximately 97 percent of the sun's harmful rays.  Sunscreens should be applied at least 15 minutes prior to expected sun exposure and then every 2 hours after that as long as sun exposure continues. If swimming or exercising sunscreen should be reapplied every 45 minutes to an hour after getting wet or sweating.  One ounce, or the equivalent of a shot glass full of sunscreen, is adequate to protect the skin not covered by a bathing suit. I also recommended a lip balm with a sunscreen as well. Sun protective clothing can be used in lieu of sunscreen but must be worn the entire time you are exposed to the sun's rays. Detail Level: Zone Action 1: Continue

## 2023-05-18 NOTE — ED BEHAVIORAL HEALTH ASSESSMENT NOTE - SUMMARY
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 Wrote a suicide note Tuesday that she weas talked out of by her sister. Had SIIP yesterday of eating peanuts (allergy) and cutting herself in the car, with a suicide note, that was self aborted.     Denies HI,A/VH, Denies current SIIP or self harm. No prior hospitalizations. No past psych history. Pt feels she can go home as she no longer has SI.

## 2023-05-18 NOTE — ED CDU PROVIDER DISPOSITION NOTE - CLINICAL COURSE
43y Female presented after suicide attempt. Medically cleared. Psych advising involuntary admission. Accepted to [facility].

## 2023-05-18 NOTE — ED BEHAVIORAL HEALTH ASSESSMENT NOTE - NSSUICPROTFACT_PSY_ALL_CORE
Responsibility to children, family, or others/Identifies reasons for living/Supportive social network of family or friends/Fear of death or the actual act of killing self/Engaged in work or school/Frustration tolerance

## 2023-05-18 NOTE — ED BEHAVIORAL HEALTH ASSESSMENT NOTE - HPI (INCLUDE ILLNESS QUALITY, SEVERITY, DURATION, TIMING, CONTEXT, MODIFYING FACTORS, ASSOCIATED SIGNS AND SYMPTOMS)
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 Pt and her family got an eviction notice from their house on Monday, where her  and surrounding friends and family learned of her "inability to handle the finances." On Tuesday the patient wrote a suicide note to everyone but then called her sister who talking her out us it, thus burning the note. On Wednesday (yesterday), the patient states more people found out about her situation in which she got upset that everyone knew her secret and was overwhelmed. The patient wrote another note and went to her car to "try to kill herself." The patient ate peanuts (which she is allergic to) and cut her wrist with a razor. As she was "in and out of it", she endorses she decided to live for her kids and  and asked somebody walking by [her car] to call 911.     Denies HI,A/VH, Denies current SIIP or self harm. No prior hospitalizations. No past psych history. Pt feels she can go home as she no longer has SI.    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 Pt and her family got an eviction notice from their house on Monday, where her  and surrounding friends and family learned of her "inability to handle the finances." On Tuesday the patient wrote a suicide note to everyone but then called her sister who talking her out us it, thus burning the note. On Wednesday (yesterday), the patient states more people found out about her situation in which she got upset that everyone knew her secret and was overwhelmed. The patient wrote another note and went to her car to "try to kill herself." The patient ate peanuts (which she is allergic to) and cut her wrist with a razor. As she was "in and out of it", she endorses she decided to live for her kids and  and asked somebody walking by [her car] to call 911.     Denies HI,A/VH, Denies current SIIP or self harm. No prior hospitalizations. No past psych history. Pt feels she can go home as she no longer has SI.    Collateral:  Nolberto (): [phone]  Writer spoke with patient's  to agreed to plan to admit patient for her own safety.  Shannon(sister): [phone]

## 2023-05-18 NOTE — ED ADULT NURSE REASSESSMENT NOTE - NSFALLUNIVINTERV_ED_ALL_ED
Monitor gait and stability/Physically safe environment - no spills, clutter or unnecessary equipment

## 2023-05-18 NOTE — ED ADULT NURSE REASSESSMENT NOTE - NS ED NURSE REASSESS COMMENT FT1
Patient in bed and resting, talking to friends and family on the phone. Aware of plan to transfer to [facility] and expressed understanding and intent to cooperate with transfer process. Accepted amoxicillin and Tetnus shot as ordered. Polite and cooperative during interactions with staff. Denied any current thoughts to harm self/others. Appetite and po fluid intake good. No distress noted, no complaints offered. Patient in bed and resting, talking to friends and family on the phone. Aware of plan to transfer to [facility] and expressed understanding and intent to cooperate with transfer process. Accepted amoxicillin and Tetnus shot as ordered. Polite and cooperative during interactions with staff. Denied any current thoughts to harm self/others. Appetite and po fluid intake good. No distress noted. Dr. Lane notified of pt c/o feeling like her "ear is clogged". Dr. Lane made aware and states she will see pt.

## 2023-05-18 NOTE — ED BEHAVIORAL HEALTH ASSESSMENT NOTE - VIOLENCE RISK FACTORS:
Impulsivity/History of being victimized/traumatized/Lack of insight into violence risk/need for treatment/Community stressors that increase the risk of destabilization

## 2023-05-18 NOTE — ED CDU PROVIDER INITIAL DAY NOTE - PHYSICAL EXAMINATION
Gen: Well appearing in NAD  Head: NC/AT  Neck: trachea midline  Resp:  No distress  Ext: abrasion to L wrist palmar aspect  Neuro:  A&O appears non focal  Skin:  Warm and dry as visualized  Psych:  flat affect

## 2023-05-18 NOTE — ED ADULT NURSE NOTE - HPI (INCLUDE ILLNESS QUALITY, SEVERITY, DURATION, TIMING, CONTEXT, MODIFYING FACTORS, ASSOCIATED SIGNS AND SYMPTOMS)
Patient was introduced to  by nurse from ED after been medically cleared by main ED MD. Patient is calm and cooperative, complied with security screening. Patient is status post Narcan. Status post suicidal attempt, denied Hallucination. Food and PO fluids given. Patient is at  for a Psych eval in AM. Vital signs: Temp 98.36, Pulse 66, /81, Spo2 97.

## 2023-05-18 NOTE — CHART NOTE - NSCHARTNOTEFT_GEN_A_CORE
SW Note: pt will be transferred to [facility] for IP psychiatric care. Accepting MD Dr. Carrion. Coordinated admit with Jevon in admissions. 2PC legals completed. SW will arrange transport

## 2023-05-18 NOTE — ED BEHAVIORAL HEALTH ASSESSMENT NOTE - RISK ASSESSMENT
2 suicide notes with 1 intent, plan and execution that was self aborted  Risk factors: depression, anxiety symptoms, impulsivity, hx of self- injurious behavior, previous suicide attempt, multiple stressors, absence of outpatient follow-up, limited insight into symptoms, poor reactivity to stressors,   Protective factors: Pt denies any active suicidal ideation/intent/plan, no hospitalizations, no family hx, has no acute affective or psychotic disorder, has responsibility to family and others, identifies reasons for living, fear of death/dying due to pain/suffering, future oriented, supportive social network or family, engaged in work/,  high frustration tolerance, no access to firearms,